# Patient Record
Sex: MALE | URBAN - METROPOLITAN AREA
[De-identification: names, ages, dates, MRNs, and addresses within clinical notes are randomized per-mention and may not be internally consistent; named-entity substitution may affect disease eponyms.]

---

## 2017-06-27 ENCOUNTER — IMPORTED ENCOUNTER (OUTPATIENT)
Dept: URBAN - METROPOLITAN AREA CLINIC 38 | Facility: CLINIC | Age: 22
End: 2017-06-27

## 2017-06-27 PROBLEM — H52.13 MYOPIA, BILATERAL: Noted: 2017-06-27

## 2017-08-09 ENCOUNTER — IMPORTED ENCOUNTER (OUTPATIENT)
Dept: URBAN - METROPOLITAN AREA CLINIC 38 | Facility: CLINIC | Age: 22
End: 2017-08-09

## 2019-03-18 ENCOUNTER — MEDICAL CORRESPONDENCE (OUTPATIENT)
Dept: HEALTH INFORMATION MANAGEMENT | Facility: CLINIC | Age: 24
End: 2019-03-18

## 2019-03-18 ENCOUNTER — TRANSFERRED RECORDS (OUTPATIENT)
Dept: HEALTH INFORMATION MANAGEMENT | Facility: CLINIC | Age: 24
End: 2019-03-18

## 2019-03-18 NOTE — TELEPHONE ENCOUNTER
RECORDS RECEIVED FROM: Left Wrist Ganglion Cyst- [facility]- no hx of imaging or surgery- appt per pt   DATE RECEIVED: 3.19.19   NOTES STATUS DETAILS   OFFICE NOTE from referring provider Received/In Epic    OFFICE NOTE from other specialist N/A    DISCHARGE SUMMARY from hospital N/A    DISCHARGE REPORT from the ER N/A    OPERATIVE REPORT N/A    MEDICATION LIST Internal    IMPLANT RECORD/STICKER N/A    LABS     CBC/DIFF N/A    CULTURES N/A    INJECTIONS DONE IN RADIOLOGY N/A    MRI N/A    CT SCAN N/A    XRAYS (IMAGES & REPORTS) N/A    TUMOR     PATHOLOGY  Slides & report N/A

## 2019-03-19 ENCOUNTER — ANCILLARY PROCEDURE (OUTPATIENT)
Dept: GENERAL RADIOLOGY | Facility: CLINIC | Age: 24
End: 2019-03-19
Attending: FAMILY MEDICINE
Payer: COMMERCIAL

## 2019-03-19 ENCOUNTER — OFFICE VISIT (OUTPATIENT)
Dept: ORTHOPEDICS | Facility: CLINIC | Age: 24
End: 2019-03-19
Payer: COMMERCIAL

## 2019-03-19 ENCOUNTER — PRE VISIT (OUTPATIENT)
Dept: ORTHOPEDICS | Facility: CLINIC | Age: 24
End: 2019-03-19

## 2019-03-19 VITALS
BODY MASS INDEX: 23.34 KG/M2 | SYSTOLIC BLOOD PRESSURE: 132 MMHG | WEIGHT: 163 LBS | HEART RATE: 89 BPM | HEIGHT: 70 IN | DIASTOLIC BLOOD PRESSURE: 84 MMHG

## 2019-03-19 DIAGNOSIS — M67.432 GANGLION CYST OF DORSUM OF LEFT WRIST: ICD-10-CM

## 2019-03-19 DIAGNOSIS — M25.532 LEFT WRIST PAIN: ICD-10-CM

## 2019-03-19 DIAGNOSIS — M25.532 LEFT WRIST PAIN: Primary | ICD-10-CM

## 2019-03-19 ASSESSMENT — MIFFLIN-ST. JEOR: SCORE: 1730.61

## 2019-03-19 NOTE — LETTER
"  RE: Arturo Soriano  [id number]  [address]  [place] [address]      Subjective:   Arturo Soriano is a 23 year old male who complains of left wrist pain that has been bothersome for the last 8-12 months. It appears to be a ganglion cyst. Saw [facility] service yesterday. Referred here.     Background:   Date of injury: None   Duration of symptoms: 8-12 months  Mechanism of Injury: Insidious Onset; Unknown   Aggravating factors: Weight through the wrist such has push ups or yoga, wrist flexion and extension  Relieving Factors: NSAIDs  Prior Evaluation: Prior Physician Evalutation: [facility]    PAST MEDICAL, SOCIAL, SURGICAL AND FAMILY HISTORY: He  has no past medical history on file.  He  has no past surgical history on file.  His family history is not on file.  He reports that  has never smoked. he has never used smokeless tobacco.    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    ALLERGIES: He has No Known Allergies.    CURRENT MEDICATIONS: He currently has no medications in their medication list.      Exam:   /84   Pulse 89   Ht 1.778 m (5' 10\")   Wt 73.9 kg (163 lb)   BMI 23.39 kg/m        Physically fit and well appearing. In no distress.     LEFT wrist with a fibrous mobile swelling at the distal radius (radial carpel joint).   No pain elsewhere. Full ROM of digits    X-rays, LEFT wrist, 3 views - Normal     Assessment/Plan:   LEFT wrist, ganglion cyst    PLAN  Discussed treatment options including needling or crushing attempt.   He agreed to proceed with crushing.   Direct pressure was applied for about 2 seconds and the cyst was lysed.   A coban pressure bandaide was applied.   He knows to take this off in about 30 mins and re-apply.    Will return if cyst recurs    John Sr MD    "

## 2019-03-19 NOTE — PROGRESS NOTES
" Subjective:   Arturo Soriano is a 23 year old male who complains of left wrist pain that has been bothersome for the last 8-12 months. It appears to be a ganglion cyst. Saw [facility] yesterday. Referred here.     Background:   Date of injury: None   Duration of symptoms: 8-12 months  Mechanism of Injury: Insidious Onset; Unknown   Aggravating factors: Weight through the wrist such has push ups or yoga, wrist flexion and extension  Relieving Factors: NSAIDs  Prior Evaluation: Prior Physician Evalutation: [facility]    PAST MEDICAL, SOCIAL, SURGICAL AND FAMILY HISTORY: He  has no past medical history on file.  He  has no past surgical history on file.  His family history is not on file.  He reports that  has never smoked. he has never used smokeless tobacco.    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    ALLERGIES: He has No Known Allergies.    CURRENT MEDICATIONS: He currently has no medications in their medication list.     REVIEW OF SYSTEMS: 12 point review of systems is negative except as noted above.     Exam:   /84   Pulse 89   Ht 1.778 m (5' 10\")   Wt 73.9 kg (163 lb)   BMI 23.39 kg/m       Physically fit and well appearing. In no distress.     LEFT wrist with a fibrous mobile swelling at the distal radius (radial carpel joint).   No pain elsewhere. Full ROM of digits    X-rays, LEFT wrist, 3 views - Normal       Assessment/Plan:   LEFT wrist, ganglion cyst    PLAN  Discussed treatment options including needling or crushing attempt.   He agreed to proceed with crushing.   Direct pressure was applied for about 2 seconds and the cyst was lysed.   A coban pressure bandaide was applied.   He knows to take this off in about 30 mins and re-apply.    Will return if cyst recurs  --John Sr MD  "

## 2019-03-25 ENCOUNTER — TELEPHONE (OUTPATIENT)
Dept: ORTHOPEDICS | Facility: CLINIC | Age: 24
End: 2019-03-25

## 2019-03-25 NOTE — TELEPHONE ENCOUNTER
Called patient back about recurrent cyst causing pain. Left message requesting he call back and make an appt with Dr. Sr as that was the plan from the last visit.

## 2019-03-25 NOTE — TELEPHONE ENCOUNTER
[facility] Call Center    Phone Message    May a detailed message be left on voicemail: yes    Reason for Call: Other: Pt requesting call back. Pt stated the Ganglion Cyst on his wrist has returned and is causing some pain. Pt needing to know if he should be seen or what he should do?     Action Taken: Message routed to:  [facility] sports med

## 2019-03-28 NOTE — TELEPHONE ENCOUNTER
[facility] Call Center    Phone Message    May a detailed message be left on voicemail: yes    Reason for Call: Other: Pt called back to set up a f/u visit with , no appts were available until 4/16/19. Pt is wondering if there is a diffrent provider  would be okay with pt seeing sometime in the next week. Pt's insurance changes at the end of April and would like to get seen sooner than 4/16/19. Please call pt back to discuss.     Action Taken: Message routed to:  [facility] ortho

## 2019-04-01 NOTE — TELEPHONE ENCOUNTER
Called and LVM stating that he can schedule with any sports medicine provider if  is too far out in advance. I also did discuss that if he is wanting it removed sometimes providers recommend to see a hand surgeon. I gave him a callback to discuss what he would like to do.

## 2019-06-04 ENCOUNTER — IMPORTED ENCOUNTER (OUTPATIENT)
Dept: URBAN - METROPOLITAN AREA CLINIC 38 | Facility: CLINIC | Age: 24
End: 2019-06-04

## 2019-06-04 PROBLEM — H52.223 REGULAR ASTIGMATISM, BILATERAL: Noted: 2019-06-04

## 2022-06-25 ASSESSMENT — TONOMETRY
OS_IOP_MMHG: 16
OS_IOP_MMHG: 18
OD_IOP_MMHG: 18
OD_IOP_MMHG: 21

## 2022-06-25 ASSESSMENT — VISUAL ACUITY
OD_CC: J1
OS_CC: 20/20
OD_CC: 20/20
OS_CC: J1

## 2022-06-25 ASSESSMENT — KERATOMETRY
OD_AXISANGLE_DEGREES: 25
OS_K2POWER_DIOPTERS: 43.25
OD_AXISANGLE2_DEGREES: 115
OD_K1POWER_DIOPTERS: 43.50
OS_AXISANGLE2_DEGREES: 45
OS_AXISANGLE_DEGREES: 135
OS_K1POWER_DIOPTERS: 44.00
OD_K2POWER_DIOPTERS: 43.00